# Patient Record
Sex: FEMALE | Race: OTHER | URBAN - METROPOLITAN AREA
[De-identification: names, ages, dates, MRNs, and addresses within clinical notes are randomized per-mention and may not be internally consistent; named-entity substitution may affect disease eponyms.]

---

## 2023-03-15 ENCOUNTER — APPOINTMENT (RX ONLY)
Dept: URBAN - METROPOLITAN AREA CLINIC 94 | Facility: CLINIC | Age: 53
Setting detail: DERMATOLOGY
End: 2023-03-15

## 2023-03-15 PROBLEM — D23.122 OTHER BENIGN NEOPLASM OF SKIN OF LEFT LOWER EYELID, INCLUDING CANTHUS: Status: ACTIVE | Noted: 2023-03-15

## 2023-03-15 PROBLEM — D23.112 OTHER BENIGN NEOPLASM OF SKIN OF RIGHT LOWER EYELID, INCLUDING CANTHUS: Status: ACTIVE | Noted: 2023-03-15

## 2023-03-15 PROBLEM — D23.39 OTHER BENIGN NEOPLASM OF SKIN OF OTHER PARTS OF FACE: Status: ACTIVE | Noted: 2023-03-15

## 2023-03-15 PROCEDURE — ? PRESCRIPTION

## 2023-03-15 PROCEDURE — ? RECOMMENDATIONS

## 2023-03-15 PROCEDURE — ? COUNSELING

## 2023-03-15 PROCEDURE — 99202 OFFICE O/P NEW SF 15 MIN: CPT

## 2023-03-15 PROCEDURE — ? PRESCRIPTION MEDICATION MANAGEMENT

## 2023-03-15 RX ORDER — TRETIONIN 0.5 MG/G
CREAM TOPICAL
Qty: 45 | Refills: 6 | Status: ERX | COMMUNITY
Start: 2023-03-15

## 2023-03-15 RX ADMIN — TRETIONIN: 0.5 CREAM TOPICAL at 00:00

## 2023-03-15 NOTE — PROCEDURE: RECOMMENDATIONS
Recommendations (Free Text): Recommended cosmetic electrodessication at [facility] or [facility] Recommendations (Free Text): Recommended cosmetic electrodessication at [facility] or [facility]

## 2023-03-15 NOTE — PROCEDURE: PRESCRIPTION MEDICATION MANAGEMENT
Plan: Follow up with Dr. Devine in our [facility] location for further evaluation Plan: Follow up with Dr. Devine in our [facility] location for further evaluation